# Patient Record
Sex: FEMALE | Race: WHITE
[De-identification: names, ages, dates, MRNs, and addresses within clinical notes are randomized per-mention and may not be internally consistent; named-entity substitution may affect disease eponyms.]

---

## 2017-11-28 ENCOUNTER — HOSPITAL ENCOUNTER (OUTPATIENT)
Dept: HOSPITAL 39 - MAMMO | Age: 67
Discharge: HOME | End: 2017-11-28
Attending: CLINIC/CENTER
Payer: MEDICARE

## 2017-11-28 DIAGNOSIS — Z12.31: Primary | ICD-10-CM

## 2017-11-28 PROCEDURE — 77063 BREAST TOMOSYNTHESIS BI: CPT

## 2017-11-29 NOTE — MAM
EXAM DESCRIPTION: 

3D Screening BILATERAL : Digital Mammography.



CLINICAL HISTORY: 

67 years Female SCREENING . No complaints. Remote family history

breast cancer. Postmenopausal. No HRT.



COMPARISON: 

2-D digital screening bilateral studies 9/16/2016 and 1/24/2014. 

Report from prior examination also reviewed.



TECHNIQUE: 

Bilateral CC and MLO projection full-field images, 3-D

tomosynthesis digital mammographic technique. Also bilateral 

synthesized CC/ MLO full-field images.  CAD not utilized.



FINDINGS: 

The breast parenchymal density pattern is:  Scattered areas of

fibroglandular density.   No skin thickening or nipple retraction

 bilateral solitary microcalcifications. Bilateral vascular

calcifications.

No focal, stellate mass or density, focal asymmetry , and no

suspicious microcalcifications bilaterally. Stable mammograms

compared to prior study, taking into account differences in

mammographic technique



IMPRESSION: 

BI-RADS CATEGORY: 2 - BENIGN FINDINGS.

FOLLOW UP: Routine digital bilateral  screening, one year

interval from  November 2017.



Written communication explaining the IMPRESSION and follow-up,

will be mailed to the patient and referring health care provider.



According to the [employer], yearly mammograms

are recommended starting at age 40 and continuing as long as a

woman is in good health.  Any breast change noted on a breast

self-exam should be reported promptly to the patient's healthcare

provider.  Breast MRI is recommended for women with an

approximately 20-25% or greater lifetime risk of breast cancer,

including women with a strong family history of breast or ovarian

cancer and women who have been treated for Hodgkin's disease.



A negative mammographic report should not delay tissue diagnosis

in patients with significant clinical history or physical

findings.



Extremely dense breast tissue limits the sensitivity of digital

mammography. 



Electronically signed by:  Abdullahi Fox MD  11/29/2017 12:21 PM

[facility] Workstation: [id number]

## 2018-12-03 ENCOUNTER — HOSPITAL ENCOUNTER (OUTPATIENT)
Dept: HOSPITAL 39 - MAMMO | Age: 68
End: 2018-12-03
Attending: CLINIC/CENTER
Payer: COMMERCIAL

## 2018-12-03 DIAGNOSIS — Z12.31: Primary | ICD-10-CM

## 2018-12-04 NOTE — MAM
EXAM DESCRIPTION: 

3D Screening BILATERAL : Digital Mammography.



CLINICAL HISTORY: 

68 years Female SCREENING . No complaints. No personal history of

breast cancer. Remote family history of breast cancer.

Childbirth. Postmenopausal 23 years. Has taken hormone

replacement with unknown details..  Lifetime risk of developing

breast cancer (Tyrer-Cuzick model)(%):  4.6.



COMPARISON: 

Bilateral screening digital breast tomosynthesis 11/28/2017.   



TECHNIQUE: 

Bilateral CC and MLO projection full-field images, digital

tomosynthesis mammographic technique  Bilateral digital 2-D

full-field MLO images. CAD not available for tomosynthesis or 2-D

images.  



FINDINGS: 

The breast parenchymal density pattern is:  Scattered areas of

fibroglandular density.   No skin thickening or nipple

retraction.  Bilateral vascular calcifications. Bilateral

solitary microcalcifications.

No new focal, stellate mass or density, focal asymmetry , and no

suspicious microcalcifications bilaterally. Stable mammograms

compared to prior study. 



IMPRESSION: 

Benign exam.



BIRAD CATEGORY: 2 BENIGN FINDINGS.



RECOMMENDATIONS:

FOLLOW UP: Routine digital bilateral  mammographic screening, one

year interval from  December 2018.



Written communication explaining the IMPRESSION and follow-up,

will be mailed to the patient and referring health care provider.



According to the [employer], yearly mammograms

are recommended starting at age 40 and continuing as long as a

woman is in good health.  Any breast change noted on a breast

self-exam should be reported promptly to the patient's healthcare

provider.  Breast MRI is recommended for women with an

approximately 20-25% or greater lifetime risk of breast cancer,

including women with a strong family history of breast or ovarian

cancer and women who have been treated for Hodgkin's disease.



A negative mammographic report should not delay tissue diagnosis

in patients with significant clinical history or physical

findings.



Extremely dense breast tissue limits the sensitivity of digital

mammography. 



Electronically signed by:  Abdullahi Fox MD  12/4/2018 6:03 PM CST

Workstation: [id number]